# Patient Record
Sex: MALE | Race: WHITE | NOT HISPANIC OR LATINO
[De-identification: names, ages, dates, MRNs, and addresses within clinical notes are randomized per-mention and may not be internally consistent; named-entity substitution may affect disease eponyms.]

---

## 2017-02-10 ENCOUNTER — APPOINTMENT (OUTPATIENT)
Dept: CT IMAGING | Facility: CLINIC | Age: 47
End: 2017-02-10

## 2017-02-10 ENCOUNTER — OUTPATIENT (OUTPATIENT)
Dept: OUTPATIENT SERVICES | Facility: HOSPITAL | Age: 47
LOS: 1 days | End: 2017-02-10
Payer: COMMERCIAL

## 2017-02-10 DIAGNOSIS — R07.89 OTHER CHEST PAIN: ICD-10-CM

## 2017-02-10 PROCEDURE — 71260 CT THORAX DX C+: CPT

## 2017-02-25 ENCOUNTER — EMERGENCY (EMERGENCY)
Facility: HOSPITAL | Age: 47
LOS: 1 days | Discharge: HOME | End: 2017-02-25
Attending: EMERGENCY MEDICINE | Admitting: EMERGENCY MEDICINE
Payer: SELF-PAY

## 2017-02-25 VITALS
RESPIRATION RATE: 16 BRPM | SYSTOLIC BLOOD PRESSURE: 126 MMHG | DIASTOLIC BLOOD PRESSURE: 82 MMHG | OXYGEN SATURATION: 100 % | HEART RATE: 86 BPM

## 2017-02-25 VITALS
HEART RATE: 73 BPM | SYSTOLIC BLOOD PRESSURE: 135 MMHG | OXYGEN SATURATION: 98 % | DIASTOLIC BLOOD PRESSURE: 83 MMHG | RESPIRATION RATE: 16 BRPM | TEMPERATURE: 98 F

## 2017-02-25 DIAGNOSIS — M25.561 PAIN IN RIGHT KNEE: ICD-10-CM

## 2017-02-25 PROCEDURE — 99283 EMERGENCY DEPT VISIT LOW MDM: CPT

## 2017-02-25 PROCEDURE — 73562 X-RAY EXAM OF KNEE 3: CPT

## 2017-02-25 PROCEDURE — 99283 EMERGENCY DEPT VISIT LOW MDM: CPT | Mod: 25

## 2017-02-25 PROCEDURE — 73562 X-RAY EXAM OF KNEE 3: CPT | Mod: 26,RT

## 2017-02-25 RX ORDER — IBUPROFEN 200 MG
600 TABLET ORAL ONCE
Qty: 0 | Refills: 0 | Status: COMPLETED | OUTPATIENT
Start: 2017-02-25 | End: 2017-02-25

## 2017-02-25 RX ORDER — AMOXICILLIN 250 MG/5ML
0 SUSPENSION, RECONSTITUTED, ORAL (ML) ORAL
Qty: 0 | Refills: 0 | COMMUNITY

## 2017-02-25 RX ADMIN — Medication 600 MILLIGRAM(S): at 18:48

## 2017-02-25 NOTE — ED ADULT NURSE NOTE - PMH
GERD (gastroesophageal reflux disease) GERD (gastroesophageal reflux disease)    Salivary gland infection

## 2017-02-25 NOTE — ED PROCEDURE NOTE - NS ED PERI VASCULAR NEG
capillary refill time < 2 seconds/fingers/toes warm to touch/no cyanosis of extremity/no paresthesia

## 2017-02-25 NOTE — ED ADULT NURSE NOTE - OBJECTIVE STATEMENT
Pt is a 45 yo M who came to the Ed via EMS c/o inj rt knee today. 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 Pt has increased difficulty ambulating. rt knee intact, + pulses, dec ROM due to pain.

## 2017-02-25 NOTE — ED PROVIDER NOTE - MEDICAL DECISION MAKING DETAILS
Plan to pain control, xray knee and reassess Plan to pain control, xray knee and reassess    Lance Saavedra DO; see attending attestation

## 2017-02-25 NOTE — ED PROVIDER NOTE - OBJECTIVE STATEMENT
47 y/o M  [employer] c/o right knee pain s/p steeping on ine glass while getting out of car today. pt states knee went medially inward. Report pain whenever he steps down.  No paresthesias or focal weakness.

## 2017-02-25 NOTE — ED PROCEDURE NOTE - CPROC ED POST PROC CARE GUIDE1
Instructed patient/caregiver regarding signs and symptoms of infection./Instructed patient/caregiver to follow-up with primary care physician./Verbal/written post procedure instructions were given to patient/caregiver./Keep the cast/splint/dressing clean and dry./Elevate the injured extremity as instructed.

## 2017-02-25 NOTE — ED PROVIDER NOTE - ATTENDING CONTRIBUTION TO CARE
46yoM with knee injury after accidently stepping on wine bottle. Pain on medial knee.   On exam, pt with pain with lateral forces. point tenderness medially. mild edema.   A; R knee pain likely ligamentous injury vs meniscus. xray negative for fx. pain meds, ice, elevate. knee immobilizer.  crutches. fu ortho.

## 2017-05-23 ENCOUNTER — APPOINTMENT (OUTPATIENT)
Dept: GASTROENTEROLOGY | Facility: CLINIC | Age: 47
End: 2017-05-23

## 2017-05-23 VITALS
HEART RATE: 77 BPM | HEIGHT: 72 IN | SYSTOLIC BLOOD PRESSURE: 118 MMHG | OXYGEN SATURATION: 96 % | WEIGHT: 199 LBS | BODY MASS INDEX: 26.95 KG/M2 | TEMPERATURE: 98.5 F | DIASTOLIC BLOOD PRESSURE: 78 MMHG

## 2017-05-23 DIAGNOSIS — K62.5 HEMORRHAGE OF ANUS AND RECTUM: ICD-10-CM

## 2017-05-23 RX ORDER — SODIUM SULFATE, POTASSIUM SULFATE, MAGNESIUM SULFATE 17.5; 3.13; 1.6 G/ML; G/ML; G/ML
17.5-3.13-1.6 SOLUTION, CONCENTRATE ORAL
Qty: 2 | Refills: 0 | Status: ACTIVE | COMMUNITY
Start: 2017-05-23 | End: 1900-01-01

## 2017-06-12 ENCOUNTER — APPOINTMENT (OUTPATIENT)
Dept: GASTROENTEROLOGY | Facility: AMBULATORY MEDICAL SERVICES | Age: 47
End: 2017-06-12

## 2017-09-07 ENCOUNTER — OTHER (OUTPATIENT)
Age: 47
End: 2017-09-07

## 2018-12-17 PROBLEM — K11.20 SIALOADENITIS, UNSPECIFIED: Chronic | Status: ACTIVE | Noted: 2017-02-25

## 2018-12-17 PROBLEM — K21.9 GASTRO-ESOPHAGEAL REFLUX DISEASE WITHOUT ESOPHAGITIS: Chronic | Status: ACTIVE | Noted: 2017-02-25

## 2019-01-03 ENCOUNTER — APPOINTMENT (OUTPATIENT)
Dept: GASTROENTEROLOGY | Facility: CLINIC | Age: 49
End: 2019-01-03
Payer: COMMERCIAL

## 2019-01-03 VITALS
HEART RATE: 77 BPM | DIASTOLIC BLOOD PRESSURE: 70 MMHG | HEIGHT: 72 IN | OXYGEN SATURATION: 97 % | BODY MASS INDEX: 27.63 KG/M2 | SYSTOLIC BLOOD PRESSURE: 110 MMHG | WEIGHT: 204 LBS | TEMPERATURE: 98.2 F

## 2019-01-03 DIAGNOSIS — F41.8 OTHER SPECIFIED ANXIETY DISORDERS: ICD-10-CM

## 2019-01-03 PROCEDURE — 99213 OFFICE O/P EST LOW 20 MIN: CPT

## 2019-01-03 RX ORDER — OMEPRAZOLE 40 MG/1
40 CAPSULE, DELAYED RELEASE ORAL
Qty: 90 | Refills: 0 | Status: ACTIVE | COMMUNITY
Start: 2019-01-03 | End: 1900-01-01

## 2019-01-03 NOTE — HISTORY OF PRESENT ILLNESS
[FreeTextEntry1] : Patient presents to the office today to discuss possible upper endoscopy. He has been experiencing some dyspeptic complaints and has been taking over-the-counter PPI with partial relief. The patient describes slight nausea associated with a sour taste. Symptoms are relieved by taking PPI. There is no associated vomiting melena bright red blood per rectum et cetera. Colonoscopy was performed in February of 17.

## 2019-01-03 NOTE — ASSESSMENT
[FreeTextEntry1] : Impression and plan\par \par Patient has dyspeptic complaints and was advised to go on prescription PPI and continue until we plan for an upper endoscopy to be performed in the not too distant future. Patient had recent abdominal ultrasound showing no significant findings. Patient was advised to update me should symptoms change or worsen prior to upper endoscopy scheduled on a further suggestions according

## 2019-01-03 NOTE — PHYSICAL EXAM
[General Appearance - Alert] : alert [General Appearance - In No Acute Distress] : in no acute distress [Sclera] : the sclera and conjunctiva were normal [PERRL With Normal Accommodation] : pupils were equal in size, round, and reactive to light [Extraocular Movements] : extraocular movements were intact [Outer Ear] : the ears and nose were normal in appearance [Oropharynx] : the oropharynx was normal [Neck Appearance] : the appearance of the neck was normal [Neck Cervical Mass (___cm)] : no neck mass was observed [Jugular Venous Distention Increased] : there was no jugular-venous distention [Thyroid Diffuse Enlargement] : the thyroid was not enlarged [Thyroid Nodule] : there were no palpable thyroid nodules [Auscultation Breath Sounds / Voice Sounds] : lungs were clear to auscultation bilaterally [Heart Rate And Rhythm] : heart rate was normal and rhythm regular [Heart Sounds] : normal S1 and S2 [Heart Sounds Gallop] : no gallops [Murmurs] : no murmurs [Heart Sounds Pericardial Friction Rub] : no pericardial rub [Edema] : there was no peripheral edema [Veins - Varicosity Changes] : there were no varicosital changes [Bowel Sounds] : normal bowel sounds [Abdomen Soft] : soft [Abdomen Tenderness] : non-tender [Abdomen Mass (___ Cm)] : no abdominal mass palpated [FreeTextEntry1] : rectal examination showed trace guaiac positive brown stool with no rectal mass [Cervical Lymph Nodes Enlarged Posterior Bilaterally] : posterior cervical [Cervical Lymph Nodes Enlarged Anterior Bilaterally] : anterior cervical [Supraclavicular Lymph Nodes Enlarged Bilaterally] : supraclavicular [No CVA Tenderness] : no ~M costovertebral angle tenderness [No Spinal Tenderness] : no spinal tenderness [Abnormal Walk] : normal gait [Nail Clubbing] : no clubbing  or cyanosis of the fingernails [Musculoskeletal - Swelling] : no joint swelling seen [Motor Tone] : muscle strength and tone were normal [Skin Color & Pigmentation] : normal skin color and pigmentation [Skin Turgor] : normal skin turgor [] : no rash [No Focal Deficits] : no focal deficits [Oriented To Time, Place, And Person] : oriented to person, place, and time

## 2019-01-23 ENCOUNTER — APPOINTMENT (OUTPATIENT)
Dept: GASTROENTEROLOGY | Facility: AMBULATORY MEDICAL SERVICES | Age: 49
End: 2019-01-23
Payer: COMMERCIAL

## 2019-01-23 PROCEDURE — 43235 EGD DIAGNOSTIC BRUSH WASH: CPT

## 2019-01-24 ENCOUNTER — APPOINTMENT (OUTPATIENT)
Dept: GASTROENTEROLOGY | Facility: AMBULATORY MEDICAL SERVICES | Age: 49
End: 2019-01-24
Payer: COMMERCIAL

## 2019-01-24 PROCEDURE — 43239 EGD BIOPSY SINGLE/MULTIPLE: CPT

## 2019-01-30 ENCOUNTER — APPOINTMENT (OUTPATIENT)
Dept: GASTROENTEROLOGY | Facility: AMBULATORY MEDICAL SERVICES | Age: 49
End: 2019-01-30

## 2019-02-07 ENCOUNTER — APPOINTMENT (OUTPATIENT)
Dept: GASTROENTEROLOGY | Facility: CLINIC | Age: 49
End: 2019-02-07
Payer: COMMERCIAL

## 2019-02-07 VITALS
BODY MASS INDEX: 27.5 KG/M2 | HEIGHT: 72 IN | SYSTOLIC BLOOD PRESSURE: 110 MMHG | OXYGEN SATURATION: 98 % | WEIGHT: 203 LBS | HEART RATE: 63 BPM | DIASTOLIC BLOOD PRESSURE: 80 MMHG | TEMPERATURE: 98.1 F

## 2019-02-07 DIAGNOSIS — R10.13 EPIGASTRIC PAIN: ICD-10-CM

## 2019-02-07 DIAGNOSIS — R07.89 OTHER CHEST PAIN: ICD-10-CM

## 2019-02-07 PROCEDURE — 99213 OFFICE O/P EST LOW 20 MIN: CPT

## 2019-02-07 NOTE — HISTORY OF PRESENT ILLNESS
[FreeTextEntry1] : Patient returns for followup and discussion of the recent testing including upper endoscopy and colonoscopy. Patient did have some retained food on his first endoscopy but second examination was clear. Colonoscopy was unrevealing. Patient continues to have discomfort in the right upper quadrant. Abdominal sonogram did not show any findings. At this point I will recommend CT scanning to better evaluate.

## 2019-02-07 NOTE — ASSESSMENT
[FreeTextEntry1] : Impression and plan\par \par Patient presents today with continued discomfort in the right upper quadrant etiology uncertain I will recommend CT scanning is an acceptable workup. Physical examination he was unrevealing and did not further localize the source of patient's pain. Patient will call back after the test is complete

## 2019-02-14 ENCOUNTER — APPOINTMENT (OUTPATIENT)
Dept: GASTROENTEROLOGY | Facility: AMBULATORY MEDICAL SERVICES | Age: 49
End: 2019-02-14

## 2020-10-22 ENCOUNTER — APPOINTMENT (OUTPATIENT)
Dept: GASTROENTEROLOGY | Facility: CLINIC | Age: 50
End: 2020-10-22
Payer: COMMERCIAL

## 2020-10-22 VITALS
DIASTOLIC BLOOD PRESSURE: 80 MMHG | WEIGHT: 192 LBS | TEMPERATURE: 98 F | HEART RATE: 62 BPM | OXYGEN SATURATION: 98 % | HEIGHT: 72 IN | BODY MASS INDEX: 26.01 KG/M2 | SYSTOLIC BLOOD PRESSURE: 110 MMHG

## 2020-10-22 DIAGNOSIS — Z12.11 ENCOUNTER FOR SCREENING FOR MALIGNANT NEOPLASM OF COLON: ICD-10-CM

## 2020-10-22 PROCEDURE — 99213 OFFICE O/P EST LOW 20 MIN: CPT

## 2020-10-22 PROCEDURE — 99072 ADDL SUPL MATRL&STAF TM PHE: CPT

## 2020-10-22 RX ORDER — SODIUM SULFATE, POTASSIUM SULFATE, MAGNESIUM SULFATE 17.5; 3.13; 1.6 G/ML; G/ML; G/ML
17.5-3.13-1.6 SOLUTION, CONCENTRATE ORAL
Qty: 1 | Refills: 0 | Status: ACTIVE | COMMUNITY
Start: 2020-10-22 | End: 1900-01-01

## 2020-10-22 NOTE — ASSESSMENT
[FreeTextEntry1] : Impression and plan patient came to the office today to arrange for colonoscopy. The risks benefits alternatives and limitations of procedure discussed make further suggestions after above testing is completed.

## 2020-10-22 NOTE — HISTORY OF PRESENT ILLNESS
[FreeTextEntry1] : Patient came to the office today to arrange for colonoscopy he turned 50 and is desirous of a screening exam.

## 2020-11-10 ENCOUNTER — APPOINTMENT (OUTPATIENT)
Dept: GASTROENTEROLOGY | Facility: AMBULATORY MEDICAL SERVICES | Age: 50
End: 2020-11-10
Payer: COMMERCIAL

## 2020-11-10 PROCEDURE — 45378 DIAGNOSTIC COLONOSCOPY: CPT

## 2020-12-23 PROBLEM — Z12.11 ENCOUNTER FOR SCREENING COLONOSCOPY: Status: RESOLVED | Noted: 2020-10-22 | Resolved: 2020-12-23

## 2022-04-21 ENCOUNTER — APPOINTMENT (OUTPATIENT)
Dept: GASTROENTEROLOGY | Facility: CLINIC | Age: 52
End: 2022-04-21
Payer: COMMERCIAL

## 2022-04-21 VITALS
HEIGHT: 72 IN | DIASTOLIC BLOOD PRESSURE: 80 MMHG | BODY MASS INDEX: 27.63 KG/M2 | OXYGEN SATURATION: 98 % | HEART RATE: 70 BPM | WEIGHT: 204 LBS | SYSTOLIC BLOOD PRESSURE: 120 MMHG | TEMPERATURE: 97.7 F

## 2022-04-21 DIAGNOSIS — R14.0 ABDOMINAL DISTENSION (GASEOUS): ICD-10-CM

## 2022-04-21 PROCEDURE — 99213 OFFICE O/P EST LOW 20 MIN: CPT
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
## 2022-04-21 NOTE — ASSESSMENT
[FreeTextEntry1] : Impression and plan\par \par Patient came to the office today with complaints of gas and bloating and occasional epigastric pain. He is feeling fairly well at this moment. I will recommend abdominal sonogram as the next step in the workup. Patient is to contact me when this is completed we will discuss further. Patient is up-to-date on endoscopy and colonoscopy.

## 2022-04-27 ENCOUNTER — APPOINTMENT (OUTPATIENT)
Dept: ULTRASOUND IMAGING | Facility: CLINIC | Age: 52
End: 2022-04-27
Payer: COMMERCIAL

## 2022-04-27 ENCOUNTER — OUTPATIENT (OUTPATIENT)
Dept: OUTPATIENT SERVICES | Facility: HOSPITAL | Age: 52
LOS: 1 days | End: 2022-04-27
Payer: COMMERCIAL

## 2022-04-27 DIAGNOSIS — Z00.00 ENCOUNTER FOR GENERAL ADULT MEDICAL EXAMINATION WITHOUT ABNORMAL FINDINGS: ICD-10-CM

## 2022-04-27 PROCEDURE — 76700 US EXAM ABDOM COMPLETE: CPT | Mod: 26

## 2022-04-27 PROCEDURE — 76700 US EXAM ABDOM COMPLETE: CPT

## 2022-05-03 DIAGNOSIS — Z01.818 ENCOUNTER FOR OTHER PREPROCEDURAL EXAMINATION: ICD-10-CM

## 2022-05-03 DIAGNOSIS — Z11.52 ENCOUNTER FOR SCREENING FOR COVID-19: ICD-10-CM

## 2022-05-23 LAB — SARS-COV-2 N GENE NPH QL NAA+PROBE: NOT DETECTED

## 2022-05-27 ENCOUNTER — APPOINTMENT (OUTPATIENT)
Dept: GASTROENTEROLOGY | Facility: AMBULATORY MEDICAL SERVICES | Age: 52
End: 2022-05-27
Payer: COMMERCIAL

## 2022-05-27 PROCEDURE — 43239 EGD BIOPSY SINGLE/MULTIPLE: CPT

## 2023-03-23 ENCOUNTER — APPOINTMENT (OUTPATIENT)
Dept: GASTROENTEROLOGY | Facility: CLINIC | Age: 53
End: 2023-03-23
Payer: COMMERCIAL

## 2023-03-23 VITALS
SYSTOLIC BLOOD PRESSURE: 108 MMHG | WEIGHT: 206.2 LBS | BODY MASS INDEX: 27.93 KG/M2 | HEART RATE: 77 BPM | HEIGHT: 72 IN | TEMPERATURE: 97.1 F | OXYGEN SATURATION: 97 % | DIASTOLIC BLOOD PRESSURE: 76 MMHG

## 2023-03-23 DIAGNOSIS — Z00.00 ENCOUNTER FOR GENERAL ADULT MEDICAL EXAMINATION W/OUT ABNORMAL FINDINGS: ICD-10-CM

## 2023-03-23 PROCEDURE — 99213 OFFICE O/P EST LOW 20 MIN: CPT

## 2023-03-23 RX ORDER — FAMOTIDINE 40 MG/1
40 TABLET, FILM COATED ORAL TWICE DAILY
Qty: 180 | Refills: 3 | Status: ACTIVE | COMMUNITY
Start: 2022-05-27 | End: 1900-01-01

## 2023-03-23 NOTE — ASSESSMENT
[FreeTextEntry1] : Impression and plan\par Patient with breakthrough dyspeptic complaints I will increase H2 blocker to twice daily dosing for short period of time.  Patient will get back to me regarding his progress.  We can add Carafate if required patient will keep me updated abdominal sonogram will be obtained for follow-up of previous gallstone and possible adenomatous changes of the gallbladder.

## 2023-03-23 NOTE — HISTORY OF PRESENT ILLNESS
[FreeTextEntry1] : Patient has been having some breakthrough dyspeptic complaints.  He underwent recent robotic surgery for prostate cancer and has been experiencing more symptoms since then.  He denies current nausea vomiting fever chills rectal bleeding or melena.  He is healing well from abdominal wounds related to robotic surgery.  He has no dysuric nor incontinence complaints.

## 2023-04-04 ENCOUNTER — APPOINTMENT (OUTPATIENT)
Dept: ULTRASOUND IMAGING | Facility: CLINIC | Age: 53
End: 2023-04-04
Payer: COMMERCIAL

## 2023-04-04 ENCOUNTER — OUTPATIENT (OUTPATIENT)
Dept: OUTPATIENT SERVICES | Facility: HOSPITAL | Age: 53
LOS: 1 days | End: 2023-04-04
Payer: COMMERCIAL

## 2023-04-04 DIAGNOSIS — Z00.00 ENCOUNTER FOR GENERAL ADULT MEDICAL EXAMINATION WITHOUT ABNORMAL FINDINGS: ICD-10-CM

## 2023-04-04 PROCEDURE — 76700 US EXAM ABDOM COMPLETE: CPT

## 2023-04-04 PROCEDURE — 76700 US EXAM ABDOM COMPLETE: CPT | Mod: 26

## 2023-06-26 ENCOUNTER — APPOINTMENT (OUTPATIENT)
Dept: PAIN MANAGEMENT | Facility: CLINIC | Age: 53
End: 2023-06-26
Payer: OTHER MISCELLANEOUS

## 2023-06-26 VITALS — HEIGHT: 72 IN | BODY MASS INDEX: 27.9 KG/M2 | WEIGHT: 206 LBS

## 2023-06-26 DIAGNOSIS — Z85.46 PERSONAL HISTORY OF MALIGNANT NEOPLASM OF PROSTATE: ICD-10-CM

## 2023-06-26 PROCEDURE — 99244 OFF/OP CNSLTJ NEW/EST MOD 40: CPT

## 2023-06-26 NOTE — DISCUSSION/SUMMARY
[de-identified] : I personally reviewed the MRI/CT scan images and agree with the radiologist's report. The radiological findings were discussed with the patient\par \par Patient is presenting with acute/sub-acute radicular pain with impairment in ADLs and functionality.  The pain has not responded to  conservative care including nsaid therapy and/or physical therapy.  There is no bleeding tendency, unstable medical condition, or systemic infection. The proposed procedure corresponds clinically to the dermatomal pattern of the affected nerve/nerves.\par \par priro tfesi in 2019 with 90% relie of pain and adls lasting 9 months \par will repeat tfesi

## 2023-06-26 NOTE — HISTORY OF PRESENT ILLNESS
[Lower back] : lower back [Work related] : work related [Dull/Aching] : dull/aching [Throbbing] : throbbing [Tightness] : tightness [Constant] : constant [Sleep] : sleep [Rest] : rest [Sitting] : sitting [Stairs] : stairs [FreeTextEntry1] : pt states he has a herniated L5-L6 , the pain goes into the right side, the glut and into the heel  [] : Patient is currently injured and not playing sports: no [FreeTextEntry3] : 06/21/2009 [FreeTextEntry7] : right side

## 2023-07-09 ENCOUNTER — FORM ENCOUNTER (OUTPATIENT)
Age: 53
End: 2023-07-09

## 2023-07-25 ENCOUNTER — APPOINTMENT (OUTPATIENT)
Age: 53
End: 2023-07-25
Payer: OTHER MISCELLANEOUS

## 2023-07-25 PROCEDURE — 64484 NJX AA&/STRD TFRM EPI L/S EA: CPT | Mod: 59,RT

## 2023-07-25 PROCEDURE — 64483 NJX AA&/STRD TFRM EPI L/S 1: CPT | Mod: RT

## 2023-08-14 ENCOUNTER — APPOINTMENT (OUTPATIENT)
Dept: PAIN MANAGEMENT | Facility: CLINIC | Age: 53
End: 2023-08-14
Payer: OTHER MISCELLANEOUS

## 2023-08-14 VITALS — HEIGHT: 72 IN | WEIGHT: 206 LBS | BODY MASS INDEX: 27.9 KG/M2

## 2023-08-14 PROCEDURE — 99214 OFFICE O/P EST MOD 30 MIN: CPT

## 2023-08-14 NOTE — HISTORY OF PRESENT ILLNESS
[Lower back] : lower back [Work related] : work related [Dull/Aching] : dull/aching [Throbbing] : throbbing [Tightness] : tightness [Constant] : constant [Sleep] : sleep [Rest] : rest [Sitting] : sitting [Stairs] : stairs [FreeTextEntry1] : pt states he has a herniated L5-L6 , the pain goes into the right side, the glut and into the heel    Procedure: Right L4-5 transforaminal epidural steroid injection   Left L4-5 transforaminal epidural steroid injection under fluoroscopic guidance. [] : Patient is currently injured and not playing sports: no [FreeTextEntry3] : 06/21/2009 [FreeTextEntry7] : right side

## 2023-08-14 NOTE — PHYSICAL EXAM
[de-identified] : PHYSICAL EXAM\par  \par  Constitutional: \par  Appears well, no apparent distress\par  Ability to communicate: Normal\par  Respiratory: non-labored breathing\par  Skin: no rash noted\par  Head: normocephalic, atraumatic\par  Neck: no visible thyroid enlargement\par  Eyes: extraocular movements intact\par  Neurologic: alert and oriented x3\par  Psychiatric: normal mood, affect, and behavior\par  \par  Lumbar Spine: \par  Palpation: right lumbar paraspinal spasm and right lumbar paraspinal tenderness to palpation.\par  ROM: Diminished range of motion in all plains.  Patient notes pain with lateral bending to the right.\par  MMT: Motor exam is 5/5 through out bilateral lower extremities.\par  Sensation: Light touch and pain is intact throughout bilateral lower extremities.\par  Reflexes: achilles and patella reflexes are intact and  symmetrical.  No sustained clonus.\par  Special Testing: Positive straight leg raise on the right side.\par  \par  Assessemnt:\par  Radiculopathy of lumbosacral region (M54.17)\par  Myalgia (M79.10)\par  \par  Plan:\par  After discussing various treatment options with the patient including but not limited to oral medications, physical therapy, exercise modalities as well as interventional spinal injections, we have decided with the following plan:\par  The patient is presenting with acute/sub-acute radicular pain with impairment in ADLs and functionality.  The pain has not responded to conservative care including NSAID therapy and/or physical therapy.  There is no bleeding tendency, unstable medical condition, or systemic infection\par  \par  The risks, benefits and alternatives of the proposed procedure were explained in detail with the patient.  The risks outlined include but are not limited to infection, bleeding, post dural puncture headache, nerve injury, a temporary increase in pain, failure to resolve symptoms, allergic reaction, symptom recurrence, and possible elevation of blood sugar.  All questions were answered to patient's satisfaction and he/she verbalized an understanding.\par  \par  Follow up 1-2 weeks post injection foe re-evaluation.\par  \par  Continue home exercises, stretching, activity modification, physical therapy, and conservative care.\par  \par  \par

## 2023-09-18 ENCOUNTER — APPOINTMENT (OUTPATIENT)
Dept: PAIN MANAGEMENT | Facility: CLINIC | Age: 53
End: 2023-09-18
Payer: OTHER MISCELLANEOUS

## 2023-10-12 ENCOUNTER — APPOINTMENT (OUTPATIENT)
Dept: PAIN MANAGEMENT | Facility: CLINIC | Age: 53
End: 2023-10-12
Payer: OTHER MISCELLANEOUS

## 2023-10-12 VITALS — HEIGHT: 72 IN | WEIGHT: 208 LBS | BODY MASS INDEX: 28.17 KG/M2

## 2023-10-12 DIAGNOSIS — M54.17 RADICULOPATHY, LUMBOSACRAL REGION: ICD-10-CM

## 2023-10-12 PROCEDURE — 99214 OFFICE O/P EST MOD 30 MIN: CPT

## 2023-12-14 ENCOUNTER — APPOINTMENT (OUTPATIENT)
Dept: GASTROENTEROLOGY | Facility: CLINIC | Age: 53
End: 2023-12-14
Payer: COMMERCIAL

## 2023-12-14 VITALS
HEIGHT: 72 IN | TEMPERATURE: 96.9 F | WEIGHT: 204 LBS | HEART RATE: 69 BPM | DIASTOLIC BLOOD PRESSURE: 74 MMHG | OXYGEN SATURATION: 96 % | SYSTOLIC BLOOD PRESSURE: 118 MMHG | BODY MASS INDEX: 27.63 KG/M2

## 2023-12-14 PROCEDURE — 99213 OFFICE O/P EST LOW 20 MIN: CPT

## 2023-12-14 RX ORDER — FAMOTIDINE 40 MG/1
40 TABLET, FILM COATED ORAL TWICE DAILY
Qty: 180 | Refills: 3 | Status: ACTIVE | COMMUNITY
Start: 2023-12-14 | End: 1900-01-01

## 2023-12-14 NOTE — ASSESSMENT
[FreeTextEntry1] : Impression and plan Patient has right upper quadrant abdominal discomfort I suspect musculoskeletal type pain related to previous laparoscopic incision.  I will treat patient for dyspepsia with double dose H2 blocker for a short period of time if he does not improve or cannot glean the etiology of the pain through self-examination and we will consider repeat sonography and/or endoscopy.  Patient will keep me updated by telephone.

## 2023-12-14 NOTE — HISTORY OF PRESENT ILLNESS
[FreeTextEntry1] : Patient came with complaints of right subchondral discomfort.  He has a burning type sensation at this location.  He has had recent previous prostate surgery and has several abdominal scars 1 is noted in the right upper quadrant.  He denies associated nausea vomiting but does have dyspepsia that has been no rectal bleeding or melena.  He denies cardiac or pulmonary complaints.  Previous testing has included abdominal sonography demonstrating a gallstone and small gallbladder polyp.  He is up-to-date on endoscopic testing previous endoscopy has shown some reflux changes and small hiatus hernia.

## 2023-12-14 NOTE — PHYSICAL EXAM
[Alert] : alert [Normal Voice/Communication] : normal voice/communication [Healthy Appearing] : healthy appearing [No Acute Distress] : no acute distress [Sclera] : the sclera and conjunctiva were normal [Hearing Threshold Finger Rub Not [place]] : hearing was normal [Normal Lips/Gums] : the lips and gums were normal [Oropharynx] : the oropharynx was normal [Normal Appearance] : the appearance of the neck was normal [No Neck Mass] : no neck mass was observed [No Respiratory Distress] : no respiratory distress [No Acc Muscle Use] : no accessory muscle use [Respiration, Rhythm And Depth] : normal respiratory rhythm and effort [Auscultation Breath Sounds / Voice Sounds] : lungs were clear to auscultation bilaterally [Heart Rate And Rhythm] : heart rate was normal and rhythm regular [Normal S1, S2] : normal S1 and S2 [Murmurs] : no murmurs [Bowel Sounds] : normal bowel sounds [Abdomen Tenderness] : non-tender [No Masses] : no abdominal mass palpated [Abdomen Soft] : soft [] : no hepatosplenomegaly [Oriented To Time, Place, And Person] : oriented to person, place, and time [de-identified] : Surgical scars are present on the abdomen from previous robotic surgery.  There is point tenderness over the right upper quadrant abdominal scar but no hernia is palpated there is increased pain with strain and cough.

## 2024-05-22 ENCOUNTER — APPOINTMENT (OUTPATIENT)
Dept: GASTROENTEROLOGY | Facility: CLINIC | Age: 54
End: 2024-05-22
Payer: COMMERCIAL

## 2024-05-22 VITALS
HEART RATE: 69 BPM | DIASTOLIC BLOOD PRESSURE: 70 MMHG | HEIGHT: 72 IN | SYSTOLIC BLOOD PRESSURE: 110 MMHG | TEMPERATURE: 96.9 F | BODY MASS INDEX: 26.82 KG/M2 | OXYGEN SATURATION: 96 % | WEIGHT: 198 LBS

## 2024-05-22 DIAGNOSIS — R10.9 UNSPECIFIED ABDOMINAL PAIN: ICD-10-CM

## 2024-05-22 PROCEDURE — 99213 OFFICE O/P EST LOW 20 MIN: CPT

## 2024-05-22 NOTE — ASSESSMENT
[FreeTextEntry1] : Impression and plan Patient came to the office today with continuing discomfort in the right upper quadrant.  I will recommend abdominal CT scan to better evaluate.  Patient will call back for results when available.  Further suggestions will follow.

## 2024-05-22 NOTE — HISTORY OF PRESENT ILLNESS
[FreeTextEntry1] : Patient came to the office today with ongoing discomfort in the right upper quadrant.  Patient has been complaining about a burning type pain in this area for a few years.  Symptomatology is intermittent.  It is not present today.  Patient has had previous abdominal ultrasound demonstrating a small stone and gallbladder polyp.  He has had robotic surgery for prostate cancer about 2 years ago.

## 2024-05-22 NOTE — PHYSICAL EXAM
[Alert] : alert [Normal Voice/Communication] : normal voice/communication [Healthy Appearing] : healthy appearing [No Acute Distress] : no acute distress [Sclera] : the sclera and conjunctiva were normal [Hearing Threshold Finger Rub Not [place]] : hearing was normal [Normal Lips/Gums] : the lips and gums were normal [Oropharynx] : the oropharynx was normal [Normal Appearance] : the appearance of the neck was normal [No Neck Mass] : no neck mass was observed [No Respiratory Distress] : no respiratory distress [No Acc Muscle Use] : no accessory muscle use [Respiration, Rhythm And Depth] : normal respiratory rhythm and effort [Auscultation Breath Sounds / Voice Sounds] : lungs were clear to auscultation bilaterally [Heart Rate And Rhythm] : heart rate was normal and rhythm regular [Normal S1, S2] : normal S1 and S2 [Murmurs] : no murmurs [Bowel Sounds] : normal bowel sounds [Abdomen Tenderness] : non-tender [No Masses] : no abdominal mass palpated [Abdomen Soft] : soft [] : no hepatosplenomegaly [Oriented To Time, Place, And Person] : oriented to person, place, and time [de-identified] : Laparoscopic surgical scar in the right upper quadrant which is nontender no hernia is palpated.  No masses noted

## 2024-05-28 ENCOUNTER — RESULT REVIEW (OUTPATIENT)
Age: 54
End: 2024-05-28

## 2024-06-04 ENCOUNTER — OUTPATIENT (OUTPATIENT)
Dept: OUTPATIENT SERVICES | Facility: HOSPITAL | Age: 54
LOS: 1 days | End: 2024-06-04
Payer: COMMERCIAL

## 2024-06-04 ENCOUNTER — APPOINTMENT (OUTPATIENT)
Dept: CT IMAGING | Facility: IMAGING CENTER | Age: 54
End: 2024-06-04
Payer: COMMERCIAL

## 2024-06-04 DIAGNOSIS — Z00.00 ENCOUNTER FOR GENERAL ADULT MEDICAL EXAMINATION WITHOUT ABNORMAL FINDINGS: ICD-10-CM

## 2024-06-04 DIAGNOSIS — Z00.8 ENCOUNTER FOR OTHER GENERAL EXAMINATION: ICD-10-CM

## 2024-06-04 PROCEDURE — 74177 CT ABD & PELVIS W/CONTRAST: CPT

## 2024-06-04 PROCEDURE — 74177 CT ABD & PELVIS W/CONTRAST: CPT | Mod: 26
